# Patient Record
Sex: FEMALE | Race: WHITE | URBAN - METROPOLITAN AREA
[De-identification: names, ages, dates, MRNs, and addresses within clinical notes are randomized per-mention and may not be internally consistent; named-entity substitution may affect disease eponyms.]

---

## 2023-07-07 ENCOUNTER — OFFICE VISIT (OUTPATIENT)
Dept: URBAN - METROPOLITAN AREA CLINIC 30 | Facility: CLINIC | Age: 53
End: 2023-07-07
Payer: MEDICARE

## 2023-07-07 DIAGNOSIS — H40.053 OCULAR HYPERTENSION, BILATERAL: Primary | ICD-10-CM

## 2023-07-07 DIAGNOSIS — H43.393 OTHER VITREOUS OPACITIES, BILATERAL: ICD-10-CM

## 2023-07-07 DIAGNOSIS — H25.013 CORTICAL AGE-RELATED CATARACT, BILATERAL: ICD-10-CM

## 2023-07-07 PROCEDURE — 92133 CPTRZD OPH DX IMG PST SGM ON: CPT | Performed by: OPTOMETRIST

## 2023-07-07 PROCEDURE — 76514 ECHO EXAM OF EYE THICKNESS: CPT | Performed by: OPTOMETRIST

## 2023-07-07 PROCEDURE — 92002 INTRM OPH EXAM NEW PATIENT: CPT | Performed by: OPTOMETRIST

## 2023-07-07 PROCEDURE — 92134 CPTRZ OPH DX IMG PST SGM RTA: CPT | Performed by: OPTOMETRIST

## 2023-07-07 ASSESSMENT — KERATOMETRY
OD: 41.83
OS: 42.48

## 2023-07-07 ASSESSMENT — INTRAOCULAR PRESSURE
OS: 20
OD: 20

## 2023-07-07 ASSESSMENT — VISUAL ACUITY
OS: 20/25
OD: 20/20

## 2023-07-07 NOTE — IMPRESSION/PLAN
Impression: Ocular hypertension, bilateral: H40.053. +Fhx (grandmother) Plan: IOP borderline OU. Thicker pachs OU- 7/2023 OCT PACHS 616, 622. Rim tissue is intact OU. Pt referred by Dr. Glynn Mckenzie. Will continue to monitor with Dr. Cuba Quiles ongoing.  FU PRN. 

7/2023 RNFL OU) WNL